# Patient Record
Sex: MALE | Race: WHITE | NOT HISPANIC OR LATINO | Employment: FULL TIME | URBAN - METROPOLITAN AREA
[De-identification: names, ages, dates, MRNs, and addresses within clinical notes are randomized per-mention and may not be internally consistent; named-entity substitution may affect disease eponyms.]

---

## 2021-03-03 PROBLEM — E89.0 POSTOPERATIVE HYPOTHYROIDISM: Status: ACTIVE | Noted: 2021-03-03

## 2021-03-03 PROBLEM — C73 THYROID CANCER: Status: ACTIVE | Noted: 2021-03-03

## 2024-05-23 ENCOUNTER — TELEPHONE (OUTPATIENT)
Dept: UROLOGY | Facility: CLINIC | Age: 44
End: 2024-05-23

## 2024-05-23 NOTE — TELEPHONE ENCOUNTER
Returned call to patient regarding scheduling Vasectomy consult, [facility] would be closer for him to travel. Scheduled appointment for 5/27/2024 @ 1pm. V/u.

## 2024-05-23 NOTE — TELEPHONE ENCOUNTER
Patient called about scheduling a vasectomy with Dr. Quinn. Please contact the patient to have this scheduled.

## 2024-05-27 ENCOUNTER — OFFICE VISIT (OUTPATIENT)
Dept: UROLOGY | Facility: CLINIC | Age: 44
End: 2024-05-27
Payer: COMMERCIAL

## 2024-05-27 VITALS — WEIGHT: 315 LBS | BODY MASS INDEX: 42.66 KG/M2 | HEIGHT: 72 IN

## 2024-05-27 DIAGNOSIS — Z30.09 VASECTOMY EVALUATION: Primary | ICD-10-CM

## 2024-05-27 PROCEDURE — 99204 OFFICE O/P NEW MOD 45 MIN: CPT | Mod: S$GLB,,, | Performed by: SPECIALIST

## 2024-05-27 PROCEDURE — 3008F BODY MASS INDEX DOCD: CPT | Mod: CPTII,S$GLB,, | Performed by: SPECIALIST

## 2024-05-27 PROCEDURE — 99999 PR PBB SHADOW E&M-EST. PATIENT-LVL III: CPT | Mod: PBBFAC,,, | Performed by: SPECIALIST

## 2024-05-27 PROCEDURE — 4010F ACE/ARB THERAPY RXD/TAKEN: CPT | Mod: CPTII,S$GLB,, | Performed by: SPECIALIST

## 2024-05-27 PROCEDURE — 1159F MED LIST DOCD IN RCRD: CPT | Mod: CPTII,S$GLB,, | Performed by: SPECIALIST

## 2024-05-27 RX ORDER — LORAZEPAM 1 MG/1
1 TABLET ORAL EVERY 6 HOURS PRN
Qty: 1 TABLET | Refills: 0 | Status: SHIPPED | OUTPATIENT
Start: 2024-05-27 | End: 2024-06-26

## 2024-05-27 NOTE — PROGRESS NOTES
[facility] Urology   Clinic Note    SUBJECTIVE:     Chief Complaint   Patient presents with    Consult     Vasectomy consult       Referral from: No ref. provider found.    History of Present Illness:  Nitish Amato is a 44 y.o. male who presents to clinic for vasectomy consult.    Three healthy children.  , desires sterilzation    Patient endorses no additional complaints at this time.    History reviewed. No pertinent past medical history.    Past Surgical History:   Procedure Laterality Date    THYROID SURGERY         Family History   Problem Relation Name Age of Onset    Heart disease Father      Heart attack Paternal Grandfather         Social History     Tobacco Use    Smoking status: Former    Smokeless tobacco: Former     Types: Chew     Quit date: 2018   Substance Use Topics    Alcohol use: Yes     Alcohol/week: 1.0 standard drink of alcohol     Types: 1 Cans of beer per week     Comment: occ    Drug use: Never       Current Outpatient Medications on File Prior to Visit   Medication Sig Dispense Refill    levothyroxine (SYNTHROID) 200 MCG tablet Take 1 tablet (200 mcg total) by mouth before breakfast. 90 tablet 3    liothyronine (CYTOMEL) 5 MCG Tab Take 1 tablet (5 mcg total) by mouth once daily. 90 tablet 3     No current facility-administered medications on file prior to visit.       Review of patient's allergies indicates:  Not on File    Review of Systems   All other systems reviewed and are negative.       Review of Systems:  A review of 10+ systems was conducted with pertinent positive and negative findings documented in HPI with all other systems reviewed and negative.    OBJECTIVE:     Estimated body mass index is 47.75 kg/m² as calculated from the following:    Height as of this encounter: 6' (1.829 m).    Weight as of this encounter: 159.7 kg (352 lb 1.2 oz).    Vital Signs (Most Recent)  There were no vitals filed for this visit.    Physical Exam:    Physical Exam     GENERAL: patient  "sitting comfortably  HEENT: normocephalic  NECK: supple, no JVD  PULM: normal chest rise, no increased WOB  HEART: non-diaphoretic  ABDO: soft, nondistended, nontender  BACK: no CVA tenderness bilaterally  SKIN: warm, dry, well perfused  EXT: no bruising or edema  NEURO: grossly normal with no focal deficits  PSYCH: appropriate mood and affect    Genitourinary Exam:  Both vas palpable      LABS:     Lab Results   Component Value Date    BUN 9 11/20/2021    CREATININE 0.95 11/20/2021    GFRNONAA 87 11/20/2021        Urinalysis:   No results found for: "UAREFLEX"     PSA:  No results found for: "PSA", "PSADIAG", "PSATOTAL", "PSAFREE"    Testosterone:  No results found for: "TOTALTESTOST", "TESTOSTERONE"     Imaging:  I have personally reviewed all relevant imaging studies.    No results found for this or any previous visit (from the past 2160 hour(s)).  No results found for this or any previous visit (from the past 2160 hour(s)).  No image results found.         ASSESSMENT     1. Vasectomy evaluation        PLAN:     Vasectomy procedure discussed at length.  Risks of vasectomy include but not limited to: persistent fertility, chronic orchalgia, hematoma, infection, or need for repeat procedure/surgery.  All Qs answered.      Trip Quinn MD  Urology  [facility]     Disclaimer: This note has been generated using voice-recognition software. There may be typographical errors that have been missed during proof-reading.     "

## 2024-05-30 DIAGNOSIS — Z30.09 VASECTOMY EVALUATION: Primary | ICD-10-CM

## 2024-06-03 ENCOUNTER — TELEPHONE (OUTPATIENT)
Dept: UROLOGY | Facility: CLINIC | Age: 44
End: 2024-06-03
Payer: COMMERCIAL

## 2024-06-03 NOTE — TELEPHONE ENCOUNTER
----- Message from Ludivina Shirley sent at 6/3/2024  8:16 AM CDT -----  Contact: PATIENT  Nitish Amato  MRN: [id number]  : 1980  PCP: Shaun Braden  Home Phone      [phone]  Work Phone      Not on file.  Mobile          [phone]      MESSAGE: Patient has a procedure scheduled for 24 but would like to see if can be moved to 24.        Phone: [phone]

## 2024-06-25 ENCOUNTER — PROCEDURE VISIT (OUTPATIENT)
Dept: UROLOGY | Facility: CLINIC | Age: 44
End: 2024-06-25
Attending: SPECIALIST
Payer: COMMERCIAL

## 2024-06-25 DIAGNOSIS — Z30.09 VASECTOMY EVALUATION: Primary | ICD-10-CM

## 2024-06-25 PROCEDURE — 88302 TISSUE EXAM BY PATHOLOGIST: CPT | Performed by: PATHOLOGY

## 2024-06-25 NOTE — PROCEDURES
Vasectomy    Date/Time: 6/25/2024 1:30 PM    Performed by: Trip Quinn MD  Authorized by: Trip Quinn MD    Consent Done?:  Yes (Written)  Indications:  [place] male  Position:  Other  Anesthesia:  20 cc 1% Lidocaine  Preparation: Patient was prepped and draped in usual sterile fashion    Incisions:  2  Vas:  Fulgurated and Clipped  Skin closures:  3-0 chromic SH     Dissection more difficult on right.  Will forward left and right vas to pathology.  Refrain from heavy lifting x one week.  Use protection until semenalysis forwarded for review, approx 12 wks.

## 2024-06-28 LAB
FINAL PATHOLOGIC DIAGNOSIS: NORMAL
GROSS: NORMAL
Lab: NORMAL

## 2024-07-02 ENCOUNTER — TELEPHONE (OUTPATIENT)
Dept: UROLOGY | Facility: CLINIC | Age: 44
End: 2024-07-02
Payer: COMMERCIAL

## 2024-07-02 NOTE — TELEPHONE ENCOUNTER
Notified patient of confirmed vas deferens from pathology report. Notified him to use protection and follow up in 2 months for a semen analysis. Patient forgot to get specimen cup the day of procedure so he will pick one up next Monday in [place]. V/u.

## 2024-07-02 NOTE — TELEPHONE ENCOUNTER
Zoe  Call patient, let him know pathology confirms vas deferens  Use protection  Forward semenalysis approx two months  M

## 2024-08-08 ENCOUNTER — PATIENT MESSAGE (OUTPATIENT)
Dept: UROLOGY | Facility: CLINIC | Age: 44
End: 2024-08-08
Payer: COMMERCIAL

## 2024-08-08 ENCOUNTER — TELEPHONE (OUTPATIENT)
Dept: UROLOGY | Facility: CLINIC | Age: 44
End: 2024-08-08
Payer: COMMERCIAL

## 2024-09-03 ENCOUNTER — OFFICE VISIT (OUTPATIENT)
Dept: UROLOGY | Facility: CLINIC | Age: 44
End: 2024-09-03
Attending: SPECIALIST
Payer: COMMERCIAL

## 2024-09-03 DIAGNOSIS — Z30.09 VASECTOMY EVALUATION: Primary | ICD-10-CM

## 2024-09-03 PROCEDURE — 99999 PR PBB SHADOW E&M-EST. PATIENT-LVL II: CPT | Mod: PBBFAC,,, | Performed by: SPECIALIST

## 2024-09-03 PROCEDURE — 4010F ACE/ARB THERAPY RXD/TAKEN: CPT | Mod: CPTII,S$GLB,, | Performed by: SPECIALIST

## 2024-09-03 PROCEDURE — 99024 POSTOP FOLLOW-UP VISIT: CPT | Mod: S$GLB,,, | Performed by: SPECIALIST

## 2024-09-03 NOTE — PROGRESS NOTES
"[facility] Urology   Clinic Note    SUBJECTIVE:     Chief Complaint   Patient presents with    Sterilization       Referral from: No ref. provider found.    History of Present Illness:  Nitish Amato is a 44 y.o. male who presents to clinic for semenalysis check after an uneventful vasectomy.    Initially, mild left testicular pain for a few weeks, now resolved.  No complaints.  Semenalysis check pending, see below.  No past medical history on file.    Current Outpatient Medications on File Prior to Visit   Medication Sig Dispense Refill    levothyroxine (SYNTHROID) 200 MCG tablet Take 1 tablet (200 mcg total) by mouth before breakfast. 90 tablet 3    liothyronine (CYTOMEL) 5 MCG Tab Take 1 tablet (5 mcg total) by mouth once daily. 90 tablet 3    LORazepam (ATIVAN) 1 MG tablet Take 1 tablet (1 mg total) by mouth every 6 (six) hours as needed for Anxiety. 1 tablet 0     No current facility-administered medications on file prior to visit.         OBJECTIVE:     Estimated body mass index is 47.75 kg/m² as calculated from the following:    Height as of 5/27/24: 6' (1.829 m).    Weight as of 5/27/24: 159.7 kg (352 lb 1.2 oz).    Vital Signs (Most Recent)  There were no vitals filed for this visit.    Physical Exam:    Physical Exam     GENERAL: patient sitting comfortably  HEENT: normocephalic  NECK: supple, no JVD  PULM: normal chest rise, no increased WOB  HEART: non-diaphoretic  ABDO: soft, nondistended, nontender  BACK: no CVA tenderness bilaterally  SKIN: warm, dry, well perfused  EXT: no bruising or edema  NEURO: grossly normal with no focal deficits  PSYCH: appropriate mood and affect    Genitourinary Exam:  Small left sperm granuloma      LABS:     Lab Results   Component Value Date    BUN 9 11/20/2021    CREATININE 0.95 11/20/2021    GFRNONAA 87 11/20/2021        Urinalysis:   No results found for: "UAREFLEX"     PSA:  No results found for: "PSA", "PSADIAG", "PSATOTAL", " ""PSAFREE"    Testosterone:  No results found for: "TOTALTESTOST", "TESTOSTERONE"     Imaging:  I have personally reviewed all relevant imaging studies.    No results found for this or any previous visit (from the past 2160 hour(s)).  No results found for this or any previous visit (from the past 2160 hour(s)).  No image results found.     SEMENALYSIS:   no viable sperm    ASSESSMENT     1. Vasectomy evaluation        PLAN:     Okay to resume unprotected intercourse.  RTC PRN.    Trip Quinn MD  Urology  [facility]     Disclaimer: This note has been generated using voice-recognition software. There may be typographical errors that have been missed during proof-reading.     "